# Patient Record
Sex: FEMALE | Race: WHITE
[De-identification: names, ages, dates, MRNs, and addresses within clinical notes are randomized per-mention and may not be internally consistent; named-entity substitution may affect disease eponyms.]

---

## 2020-11-03 ENCOUNTER — HOSPITAL ENCOUNTER (OUTPATIENT)
Dept: HOSPITAL 76 - DI | Age: 30
Discharge: HOME | End: 2020-11-03
Attending: ADVANCED PRACTICE MIDWIFE
Payer: COMMERCIAL

## 2020-11-03 DIAGNOSIS — Z30.431: Primary | ICD-10-CM

## 2020-11-03 DIAGNOSIS — N83.201: ICD-10-CM

## 2020-11-03 DIAGNOSIS — N83.202: ICD-10-CM

## 2020-11-03 PROCEDURE — 76830 TRANSVAGINAL US NON-OB: CPT

## 2020-11-03 PROCEDURE — 76856 US EXAM PELVIC COMPLETE: CPT

## 2020-11-04 NOTE — ULTRASOUND REPORT
PROCEDURE:  Pelvic w/Transvaginal

 

INDICATIONS:  IUD SURVEILLANCE

 

TECHNIQUE:  

Real-time scanning was performed of the pelvic organs, with image documentation.  Additional endovagi
nal scanning was necessary due to incomplete visualization of the adnexal and endometrial structures 
by transabdominal scanning.  

 

COMPARISON:  None.

 

FINDINGS:  

Transabdominal scanning:  Limited scanning through the kidneys shows no hydronephrosis.  No pathologi
c free abdominal or pelvic fluid.  

 

Endovaginal scanning:  

Uterus:  Uterus is normal in size at 7.4 x 2.8 x 3.6 cm.  . Echotexture is normal. The endometrium me
asures 3.0 mm in combined thickness. Intrauterine device is centrally positioned within the uterus. 

 

Ovaries:  Right ovary measures 2.6 x 1.3 x 1.3 cm with total volume of 2.2 cc. Left ovary measures 3.
5 x 2.0 x 3.4 cm with total volume of 12.4 cc. 1.2 x 0.8 x 1.0 cm oval cyst noted in the right ovary.
 2.0 x 1.8 x 1.8 cm cyst noted in the left ovary. Less than 12 small follicles identified in the ovar
ies bilaterally.

 

 

IMPRESSION:  

 

1. Intrauterine device is properly positioned.

 

2. Uterus is sonographically normal.

 

3. Small bilateral ovarian cysts.

 

Reviewed by: Claribel Katz MD, PhD on 11/4/2020 10:34 AM PST

Approved by: Claribel Katz MD, PhD on 11/4/2020 10:34 AM PST

 

 

Station ID:  [id number]